# Patient Record
Sex: MALE | Race: WHITE
[De-identification: names, ages, dates, MRNs, and addresses within clinical notes are randomized per-mention and may not be internally consistent; named-entity substitution may affect disease eponyms.]

---

## 2020-03-01 ENCOUNTER — HOSPITAL ENCOUNTER (OUTPATIENT)
Dept: HOSPITAL 63 - US | Age: 35
Discharge: HOME | End: 2020-03-01
Attending: HOSPITALIST
Payer: OTHER GOVERNMENT

## 2020-03-01 DIAGNOSIS — M25.551: Primary | ICD-10-CM

## 2020-03-01 DIAGNOSIS — M79.604: ICD-10-CM

## 2020-03-01 PROCEDURE — 93971 EXTREMITY STUDY: CPT

## 2020-03-01 NOTE — RAD
Right Lower Extremity Venous Doppler Ultrasound

 

History: Right leg pain

 

Comparison: None

 

Procedure: Color flow, duplex, spectral analysis and 2D images are 

obtained with and without compression in the area of the common femoral 

vein, superficial femoral vein - femoral vein junction, main femoral vein 

(superficial femoral vein) and popliteal vein. Veins of the proximal calf 

are also imaged.

 

Findings:

 

There is normal duplex flow, color flow and compressibility of all 

visualized vein segments. No evidence of deep venous thrombus is present.

 

Impression: 

 

No evidence of DVT. 

 

Electronically signed by: Kane Morel III, MD (3/1/2020 2:05 PM) [id number]